# Patient Record
Sex: MALE | Race: OTHER | HISPANIC OR LATINO | Employment: OTHER | URBAN - METROPOLITAN AREA
[De-identification: names, ages, dates, MRNs, and addresses within clinical notes are randomized per-mention and may not be internally consistent; named-entity substitution may affect disease eponyms.]

---

## 2017-02-21 ENCOUNTER — HOSPITAL ENCOUNTER (OUTPATIENT)
Dept: SLEEP CENTER | Facility: HOSPITAL | Age: 42
Discharge: HOME | End: 2017-02-21
Attending: INTERNAL MEDICINE
Payer: COMMERCIAL

## 2017-02-21 DIAGNOSIS — Z99.89 OSA ON CPAP: ICD-10-CM

## 2017-02-21 DIAGNOSIS — G47.33 OSA ON CPAP: ICD-10-CM

## 2019-08-19 ENCOUNTER — TRANSCRIBE ORDERS (OUTPATIENT)
Dept: ADMINISTRATIVE | Facility: HOSPITAL | Age: 44
End: 2019-08-19

## 2019-08-19 DIAGNOSIS — M54.16 LUMBAR RADICULOPATHY: Primary | ICD-10-CM

## 2019-09-12 ENCOUNTER — HOSPITAL ENCOUNTER (OUTPATIENT)
Dept: MRI IMAGING | Facility: HOSPITAL | Age: 44
Discharge: HOME | End: 2019-09-12
Payer: COMMERCIAL

## 2019-09-12 DIAGNOSIS — M54.16 LUMBAR RADICULOPATHY: ICD-10-CM

## 2019-09-12 PROCEDURE — 72148 MRI LUMBAR SPINE W/O DYE: CPT

## 2021-07-06 ENCOUNTER — TELEPHONE (OUTPATIENT)
Dept: FAMILY MEDICINE CLINIC | Facility: CLINIC | Age: 46
End: 2021-07-06
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
## 2021-07-14 RX ORDER — IBUPROFEN 600 MG/1
600 TABLET ORAL 4 TIMES DAILY
COMMUNITY
Start: 2021-06-20 | End: 2021-08-16 | Stop reason: ALTCHOICE

## 2021-07-14 RX ORDER — TRAMADOL HYDROCHLORIDE 50 MG/1
50 TABLET ORAL EVERY 6 HOURS PRN
COMMUNITY
Start: 2021-06-20 | End: 2021-08-16 | Stop reason: ALTCHOICE

## 2021-07-15 ENCOUNTER — OFFICE VISIT (OUTPATIENT)
Dept: FAMILY MEDICINE CLINIC | Facility: CLINIC | Age: 46
End: 2021-07-15
Payer: COMMERCIAL

## 2021-07-15 VITALS
HEART RATE: 73 BPM | HEIGHT: 69 IN | BODY MASS INDEX: 29.8 KG/M2 | TEMPERATURE: 97.2 F | WEIGHT: 201.2 LBS | DIASTOLIC BLOOD PRESSURE: 82 MMHG | RESPIRATION RATE: 20 BRPM | OXYGEN SATURATION: 97 % | SYSTOLIC BLOOD PRESSURE: 130 MMHG
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
## 2021-07-15 DIAGNOSIS — M54.2 NECK PAIN: Primary | ICD-10-CM

## 2021-07-15 DIAGNOSIS — M54.50 ACUTE MIDLINE LOW BACK PAIN WITHOUT SCIATICA: ICD-10-CM

## 2021-07-15 DIAGNOSIS — R01.1 SYSTOLIC MURMUR: ICD-10-CM

## 2021-07-15 DIAGNOSIS — R42 DIZZINESS: ICD-10-CM

## 2021-07-15 DIAGNOSIS — R93.0 ABNORMAL HEAD CT: ICD-10-CM

## 2021-07-15 PROCEDURE — 99204 OFFICE O/P NEW MOD 45 MIN: CPT | Performed by: PHYSICIAN ASSISTANT

## 2021-07-15 RX ORDER — MECLIZINE HYDROCHLORIDE 25 MG/1
25 TABLET ORAL EVERY 8 HOURS PRN
Qty: 30 TABLET | Refills: 0 | Status: SHIPPED | OUTPATIENT
Start: 2021-07-15 | End: 2021-11-04 | Stop reason: ALTCHOICE

## 2021-07-15 NOTE — ASSESSMENT & PLAN NOTE
Jersey Stark reports that [facility] ED did a scan of his head and there was something abnormal, however he isn't sure what it was  Records unable to be seen via [place]; will attempt to get records from [facility], if needed will repeat head CT

## 2021-07-15 NOTE — ASSESSMENT & PLAN NOTE
Systolic murmur noted on exam  Pt denies ever being told he had a murmur in the past  No chest pain, SOB, GILMAN, palpitations  Echo ordered  If abnormal, follow up with cardiology
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
## 2021-07-15 NOTE — ASSESSMENT & PLAN NOTE
PT referral given  Continue current treatment with NSAIDs  Also recommended applying heat to the area

## 2021-07-27 ENCOUNTER — EVALUATION (OUTPATIENT)
Dept: PHYSICAL THERAPY | Facility: CLINIC | Age: 46
End: 2021-07-27
Payer: COMMERCIAL

## 2021-07-27 DIAGNOSIS — M54.50 ACUTE MIDLINE LOW BACK PAIN WITHOUT SCIATICA: ICD-10-CM

## 2021-07-27 DIAGNOSIS — M54.2 NECK PAIN: ICD-10-CM

## 2021-07-27 DIAGNOSIS — R42 DIZZINESS: ICD-10-CM

## 2021-07-27 PROCEDURE — 97110 THERAPEUTIC EXERCISES: CPT | Performed by: PHYSICAL THERAPIST

## 2021-07-27 PROCEDURE — 97162 PT EVAL MOD COMPLEX 30 MIN: CPT | Performed by: PHYSICAL THERAPIST
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
## 2021-08-05 ENCOUNTER — HOSPITAL ENCOUNTER (OUTPATIENT)
Dept: NON INVASIVE DIAGNOSTICS | Age: 46
Discharge: HOME | End: 2021-08-05
Payer: COMMERCIAL

## 2021-08-05 ENCOUNTER — APPOINTMENT (OUTPATIENT)
Dept: PHYSICAL THERAPY | Facility: CLINIC | Age: 46
End: 2021-08-05
Payer: COMMERCIAL

## 2021-08-05 DIAGNOSIS — R01.1 SYSTOLIC MURMUR: ICD-10-CM

## 2021-08-05 PROCEDURE — 93306 TTE W/DOPPLER COMPLETE: CPT

## 2021-08-05 PROCEDURE — 93306 TTE W/DOPPLER COMPLETE: CPT | Performed by: INTERNAL MEDICINE

## 2021-08-06 ENCOUNTER — TELEPHONE (OUTPATIENT)
Dept: FAMILY MEDICINE CLINIC | Facility: CLINIC | Age: 46
End: 2021-08-06

## 2021-08-06 DIAGNOSIS — Q23.1 BICUSPID AORTIC VALVE: Primary | ICD-10-CM

## 2021-08-06 NOTE — TELEPHONE ENCOUNTER
Try to call pt mailbox full next appt 8/16/21 will try to contact again       ----- Message from Shana Park PA-C sent at 8/6/2021  8:29 AM EDT -----  Please call pt regarding echo results  His aortic valve was found to have two leaflets  Normally, the aortic valve has three leaflets  This abnormality is usually hereditary, and may or may not cause problems such as narrowing or stiffening of the aortic valve over time  Please make an appointment with cardiology to discuss this further and for recommendations for management (referral placed)

## 2021-08-10 ENCOUNTER — OFFICE VISIT (OUTPATIENT)
Dept: PHYSICAL THERAPY | Facility: CLINIC | Age: 46
End: 2021-08-10
Payer: COMMERCIAL

## 2021-08-10 DIAGNOSIS — R42 DIZZINESS: ICD-10-CM

## 2021-08-10 DIAGNOSIS — M54.2 NECK PAIN: Primary | ICD-10-CM

## 2021-08-10 DIAGNOSIS — M54.50 ACUTE MIDLINE LOW BACK PAIN WITHOUT SCIATICA: ICD-10-CM

## 2021-08-10 PROCEDURE — 97110 THERAPEUTIC EXERCISES: CPT | Performed by: PHYSICAL THERAPIST

## 2021-08-10 PROCEDURE — 97140 MANUAL THERAPY 1/> REGIONS: CPT | Performed by: PHYSICAL THERAPIST

## 2021-08-10 PROCEDURE — 97112 NEUROMUSCULAR REEDUCATION: CPT | Performed by: PHYSICAL THERAPIST

## 2021-08-10 NOTE — PROGRESS NOTES
Daily Note     Today's date: 8/10/2021  Patient name: Jennifer Laura  : 1975  MRN: [id number]  Referring provider: Verlan Alpers, PA-C  Dx:   Encounter Diagnosis     ICD-10-CM    1  Neck pain  M54 2    2  Acute midline low back pain without sciatica  M54 5    3  Dizziness  R42                   Subjective: Patient reports since his IE he is feeling a little bit better overall with mobility  He has been working in a very busy environment  He reports (L) sidebending is better but he still has a lot of restrictions and pain with (R) sidebending  He reports his HEP is going well  Objective: See treatment diary below      Assessment:   Stage: subacute  Stability of Symptoms:  At Evaluation: stable - unchanged   Global Rating of Change:   Symptom Irritability Level: high  Primary Movement Diagnosis: cervical and thoracic hypomobility   Patient Specific Functional Scale:   21: return to working without restrictions 2/10, full neck ROM 3/10, return to heavy lifting 1/10; Total   FOTO Prediction: 59 by visit 15  FOTO Progress: 39 at evaluation   Current Activity Recommendations: added to HEP (); added to HEP (8/10)  Current Educational Needs: progressions    Patient had good tolerance to manual treatment - significant improvement was noted in cervical and thoracic ROM following  He is making very good progress with PT - continues to require some guidance managing work participation  HEP was updated with good understanding  Skilled PT continues to be required to guide progression of thoracic and cervical mobility and strength to allow him to return to working full time without restrictions       Plan: Continue with POC - monitor tolerance to treatment and updated HEP - progress to modified prone strengthening NV     Daily Treatment Diary     DX: Neck Pain, LBP  EPOC: 21  Precautions: NA  CO-MORBIDITIES: NA  HEP ACCESS CODE: [facility]      Treatment Diary  8/10        Manual Therapy Thoracic Mobs PA  Gr 2/3  2 min        Cervical Mobs PA  Gr 2/3  2 min        Cervical Traction  4 min                                            Therapeutic Exercise  HEP         UBE  2'/2'                  Cervical AROM Matrix 7/27 6 Way  10x ea        Thoracic AROM Matrix  7/27 6Way  10x ea                  Cervical Isometrics  8/10 4 Way  5"x10                  Mod Prone Sh' Row          Mod Prone Sh' Ext                              Neuromuscular Reeducation                    TB Sh' Row  GTB  2x10        TB Sh' Ext  GTB  2x10        TB Triceps  GTB  2x10        TB (B) ER          TB Uni H' ABD                                                             Therapeutic Activity                              Gait Training                                        Modalities

## 2021-08-12 ENCOUNTER — OFFICE VISIT (OUTPATIENT)
Dept: PHYSICAL THERAPY | Facility: CLINIC | Age: 46
End: 2021-08-12
Payer: COMMERCIAL

## 2021-08-12 DIAGNOSIS — M54.2 NECK PAIN: Primary | ICD-10-CM

## 2021-08-12 DIAGNOSIS — R42 DIZZINESS: ICD-10-CM

## 2021-08-12 DIAGNOSIS — M54.50 ACUTE MIDLINE LOW BACK PAIN WITHOUT SCIATICA: ICD-10-CM

## 2021-08-12 PROCEDURE — 97112 NEUROMUSCULAR REEDUCATION: CPT | Performed by: PHYSICAL THERAPIST

## 2021-08-12 PROCEDURE — 97110 THERAPEUTIC EXERCISES: CPT | Performed by: PHYSICAL THERAPIST

## 2021-08-12 PROCEDURE — 97140 MANUAL THERAPY 1/> REGIONS: CPT | Performed by: PHYSICAL THERAPIST

## 2021-08-12 NOTE — PROGRESS NOTES
Daily Note     Today's date: 2021  Patient name: Alexandrea Kraus  : 1975  MRN: [id number]  Referring provider: Rayne Jones PA-C  Dx:   Encounter Diagnosis     ICD-10-CM    1  Neck pain  M54 2    2  Acute midline low back pain without sciatica  M54 5    3  Dizziness  R42                   Subjective: Patient reports good tolerance to his LV  He reports he continues to have some (R) shoulder soreness and restrictions with working  He has been performing his HEP regularly  Objective: See treatment diary below      Assessment:   Stage: subacute  Stability of Symptoms:  At Evaluation: stable - unchanged   Global Rating of Change:   Symptom Irritability Level: high  Primary Movement Diagnosis: cervical and thoracic hypomobility   Patient Specific Functional Scale:   21: return to working without restrictions 2/10, full neck ROM 3/10, return to heavy lifting 1/10; Total   FOTO Prediction: 59 by visit 15  FOTO Progress: 39 at evaluation   Current Activity Recommendations: added to HEP (); added to HEP (8/10)  Current Educational Needs: progressions    Patient continues to respond well to manual treatment - thoracic and cervical ROM continue to improve well  He had slight discomfort with modified prone exercises  He continues to make good progress with PT - limited with work demands  Skilled PT continues to be required to guide progression of thoracic and cervical mobility and strength to allow him to return to working full time without restrictions  Plan: Continue with POC - monitor tolerance to treatment   Progress shoulder strengthening NV       Daily Treatment Diary     DX: Neck Pain, LBP  EPOC: 21  Precautions: NA  CO-MORBIDITIES: NA  HEP ACCESS CODE: [facility]      Treatment Diary  8/10 8/12       Manual Therapy         Thoracic Mobs PA  Gr 2/3  2 min PA  Gr 2/3  2 min       Cervical Mobs PA  Gr 2/3  2 min PA   Gr 2/3  2 min       Cervical Traction  4 min 4 min Therapeutic Exercise  HEP         UBE  2'/2' 2'/2'                 Cervical AROM Matrix 7/27 6 Way  10x ea 6 Way   15x ea       Thoracic AROM Matrix  7/27 6 Way  10x ea 6 Way  15x ea                 Cervical Isometrics  8/10 4 Way  5"x10 4 Way  5"x10                 Mod Prone Sh' Row   5# 15x ea       Mod Prone Sh' Ext   2# 15x ea                 Standing Sh' Flex          Standing Sh' Scaption          Standing Sh' ABD                    Neuromuscular Reeducation                    TB Sh' Row  GTB  2x10 GTB  20x       TB Sh' Ext  GTB  2x10 GTB  20x       TB Triceps  GTB  2x10 BTB  20x       TB (B) ER          TB Uni H' ABD                                                             Therapeutic Activity                              Gait Training                                        Modalities

## 2021-08-16 ENCOUNTER — OFFICE VISIT (OUTPATIENT)
Dept: FAMILY MEDICINE CLINIC | Facility: CLINIC | Age: 46
End: 2021-08-16
Payer: COMMERCIAL

## 2021-08-16 VITALS
RESPIRATION RATE: 20 BRPM | SYSTOLIC BLOOD PRESSURE: 118 MMHG | BODY MASS INDEX: 29.89 KG/M2 | OXYGEN SATURATION: 99 % | HEART RATE: 67 BPM | TEMPERATURE: 97.2 F | DIASTOLIC BLOOD PRESSURE: 80 MMHG | WEIGHT: 201.8 LBS | HEIGHT: 69 IN

## 2021-08-16 DIAGNOSIS — Q23.1 BICUSPID AORTIC VALVE: Primary | ICD-10-CM
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
## 2021-08-16 DIAGNOSIS — R91.1 LUNG NODULE < 6CM ON CT: ICD-10-CM

## 2021-08-16 PROBLEM — M54.2 NECK PAIN: Status: RESOLVED | Noted: 2021-07-15 | Resolved: 2021-08-16

## 2021-08-16 PROBLEM — R93.0 ABNORMAL HEAD CT: Status: RESOLVED | Noted: 2021-07-15 | Resolved: 2021-08-16

## 2021-08-16 PROBLEM — R42 DIZZINESS: Status: RESOLVED | Noted: 2021-07-15 | Resolved: 2021-08-16

## 2021-08-16 PROBLEM — R01.1 SYSTOLIC MURMUR: Status: RESOLVED | Noted: 2021-07-15 | Resolved: 2021-08-16

## 2021-08-16 PROCEDURE — 99214 OFFICE O/P EST MOD 30 MIN: CPT | Performed by: PHYSICIAN ASSISTANT
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
## 2021-08-16 NOTE — ASSESSMENT & PLAN NOTE
Neck pain, back pain, dizziness much improved  Imaging from [place] reviewed; all normal except for small incidental lung nodule on CT chest, as above  No longer requiring pain meds   Continue PT

## 2021-08-16 NOTE — ASSESSMENT & PLAN NOTE
Bicuspid aortic valve with moderate stenosis and mild-moderate regurg on echo 8/5/21  Discussed results with pt and son  He is asymptomatic, however has family history of heart problems  Systolic murmur on exam     Recommend follow up with cardiologist for monitoring and management as needed

## 2021-08-24 ENCOUNTER — OFFICE VISIT (OUTPATIENT)
Dept: PHYSICAL THERAPY | Facility: CLINIC | Age: 46
End: 2021-08-24
Payer: COMMERCIAL

## 2021-08-24 DIAGNOSIS — M54.50 ACUTE MIDLINE LOW BACK PAIN WITHOUT SCIATICA: ICD-10-CM

## 2021-08-24 DIAGNOSIS — M54.2 NECK PAIN: Primary | ICD-10-CM

## 2021-08-24 PROCEDURE — 97110 THERAPEUTIC EXERCISES: CPT | Performed by: PHYSICAL THERAPIST

## 2021-08-24 PROCEDURE — 97112 NEUROMUSCULAR REEDUCATION: CPT | Performed by: PHYSICAL THERAPIST

## 2021-08-24 PROCEDURE — 97140 MANUAL THERAPY 1/> REGIONS: CPT | Performed by: PHYSICAL THERAPIST

## 2021-08-24 NOTE — PROGRESS NOTES
Daily Note     Today's date: 2021  Patient name: Jennifer Laura  : 1975  MRN: [id number]  Referring provider: Verlan Alpers, PA-C  Dx:   Encounter Diagnosis     ICD-10-CM    1  Neck pain  M54 2    2  Acute midline low back pain without sciatica  M54 5               Subjective: Patient reports no adverse reaction to his LV  He reports on 21 he had his wisdom teeth taken out - has significant increase in jaw pain and headaches since  He reports seeing improvements with shoulder pain with work activities - still limited with lifting  He reports his HEP is going well  Objective: See treatment diary below      Assessment:   Stage: subacute  Stability of Symptoms:  At Evaluation: stable - unchanged   Global Rating of Change:   Symptom Irritability Level: high  Primary Movement Diagnosis: cervical and thoracic hypomobility   Patient Specific Functional Scale:   21: return to working without restrictions 2/10, full neck ROM 3/10, return to heavy lifting 1/10; Total   FOTO Prediction: 59 by visit 15  FOTO Progress: 39 at evaluation   Current Activity Recommendations: added to HEP (); added to HEP (8/10)  Current Educational Needs: progressions      Patient continues to respond well to manual treatment - cervical and thoracic mobility continue to improve  He was able to complete exercises without complaints of pain  Exercises not progressed secondary to recent oral surgery  Skilled PT continues to be required to guide progression of thoracic and cervical mobility and strength to allow him to return to working full time without restrictions  Plan: Continue with POC - monitor tolerance to treatment   Progress shoulder strengthening NV       Daily Treatment Diary     DX: Neck Pain, LBP  EPOC: 21  Precautions: NA  CO-MORBIDITIES: NA  HEP ACCESS CODE: [facility]      Treatment Diary  8/10 8/12 8/24      Manual Therapy         Thoracic Mobs PA  Gr 2/3  2 min PA  Gr 2/3  2 min PA  Gr 2/3  2 min      Cervical Mobs PA  Gr 2/3  2 min PA   Gr 2/3  2 min PA  Gr 2/3  2 min      Cervical Traction  4 min 4 min 4 min                                          Therapeutic Exercise  HEP         UBE  2'/2' 2'/2' 2'/2'                Cervical AROM Matrix 7/27 6 Way  10x ea 6 Way   15x ea 6 Way  15x ea      Thoracic AROM Matrix  7/27 6 Way  10x ea 6 Way  15x ea 6 Way  15x ea                Cervical Isometrics  8/10 4 Way  5"x10 ea 4 Way  5"x10 ea 4 Way  5"x10 ea                Mod Prone Sh' Row   5# 15x ea 5# 20x ea      Mod Prone Sh' Ext   2# 15x ea 2# 20x ea                Standing Sh' Flex          Standing Sh' Scaption          Standing Sh' ABD                    Neuromuscular Reeducation                    TB Sh' Row  GTB  2x10 GTB  20x GTB  20x      TB Sh' Ext  GTB  2x10 GTB  20x GTB  20x      TB Triceps  GTB  2x10 BTB  20x BTB  20x      TB (B) ER          TB Uni H' ABD                                                             Therapeutic Activity                              Gait Training                                        Modalities

## 2021-08-26 ENCOUNTER — OFFICE VISIT (OUTPATIENT)
Dept: PHYSICAL THERAPY | Facility: CLINIC | Age: 46
End: 2021-08-26
Payer: COMMERCIAL

## 2021-08-26 DIAGNOSIS — M54.50 ACUTE MIDLINE LOW BACK PAIN WITHOUT SCIATICA: ICD-10-CM

## 2021-08-26 DIAGNOSIS — M54.2 NECK PAIN: Primary | ICD-10-CM

## 2021-08-26 PROCEDURE — 97110 THERAPEUTIC EXERCISES: CPT

## 2021-08-26 PROCEDURE — 97140 MANUAL THERAPY 1/> REGIONS: CPT

## 2021-08-26 PROCEDURE — 97112 NEUROMUSCULAR REEDUCATION: CPT

## 2021-08-26 NOTE — PROGRESS NOTES
Daily Note     Today's date: 2021  Patient name: Abeba Morales  : 1975  MRN: [id number]  Referring provider: Navi West PA-C  Dx:   Encounter Diagnosis     ICD-10-CM    1  Neck pain  M54 2    2  Acute midline low back pain without sciatica  M54 5               Subjective: Patient reports he was a little sore after LV  He reports overall he is doing a little better but his low back is bothering him today  He reports as he becomes more consistent with HEP it has been helping him      Objective: See treatment diary below      Assessment:   Stage: subacute  Stability of Symptoms:  At Evaluation: stable - unchanged   Global Rating of Change:   Symptom Irritability Level: high  Primary Movement Diagnosis: cervical and thoracic hypomobility   Patient Specific Functional Scale:   21: return to working without restrictions 2/10, full neck ROM 3/10, return to heavy lifting 1/10; Total   FOTO Prediction: 59 by visit 15  FOTO Progress: 39 at evaluation   Current Activity Recommendations: added to HEP (); added to HEP (8/10)  Current Educational Needs: progressions      Patient responded well to manual therapy today with improved mobility and less discomfort with cervical movements  He was able to tolerated reintroducing shoulder strengthening TE's today with no adverse effects  He began to fatigue by end of session but felt better from when he had started session  He would continue to benefit from skilled PT  Plan: Continue with POC - monitor tolerance to treatment  Monitor tolerance to progressed session          Daily Treatment Diary     DX: Neck Pain, LBP  EPOC: 21  Precautions: NA  CO-MORBIDITIES: NA  HEP ACCESS CODE: [facility]      Treatment Diary  8/10 8/12 8/24 8/26     Manual Therapy         Thoracic Mobs PA  Gr 2/3  2 min PA  Gr 2/3  2 min PA  Gr 2/3  2 min PA  Gr 2/3  ME PT     Cervical Mobs PA  Gr 2/3  2 min PA   Gr 2/3  2 min PA  Gr 2/3  2 min PA  Gr 2/3  ME PT     Cervical Traction  4 min 4 min 4 min 4 min                                         Therapeutic Exercise  HEP         UBE  2'/2' 2'/2' 2'/2' 2'/2'               Cervical AROM Matrix 7/27 6 Way  10x ea 6 Way   15x ea 6 Way  15x ea 6 Way  15x ea     Thoracic AROM Matrix  7/27 6 Way  10x ea 6 Way  15x ea 6 Way  15x ea 6 Way  15x ea               Cervical Isometrics  8/10 4 Way  5"x10 ea 4 Way  5"x10 ea 4 Way  5"x10 ea 4 Way  5"x10 ea               Mod Prone Sh' Row   5# 15x ea 5# 20x ea 5# 20x ea     Mod Prone Sh' Ext   2# 15x ea 2# 20x ea 5# 20x ea               Standing Sh' Flex     x10     Standing Sh' Scaption     x10     Standing Sh' ABD     x10               Neuromuscular Reeducation                    TB Sh' Row  GTB  2x10 GTB  20x GTB  20x GTB  20x     TB Sh' Ext  GTB  2x10 GTB  20x GTB  20x GTB  20x     TB Triceps  GTB  2x10 BTB  20x BTB  20x      TB (B) ER     GTB  20x     TB Uni H' ABD                                                             Therapeutic Activity                              Gait Training                                        Modalities

## 2021-08-31 ENCOUNTER — APPOINTMENT (OUTPATIENT)
Dept: PHYSICAL THERAPY | Facility: CLINIC | Age: 46
End: 2021-08-31
Payer: COMMERCIAL

## 2021-09-02 ENCOUNTER — OFFICE VISIT (OUTPATIENT)
Dept: PHYSICAL THERAPY | Facility: CLINIC | Age: 46
End: 2021-09-02
Payer: COMMERCIAL

## 2021-09-02 DIAGNOSIS — M54.2 NECK PAIN: Primary | ICD-10-CM

## 2021-09-02 DIAGNOSIS — M54.50 ACUTE MIDLINE LOW BACK PAIN WITHOUT SCIATICA: ICD-10-CM

## 2021-09-02 PROCEDURE — 97140 MANUAL THERAPY 1/> REGIONS: CPT

## 2021-09-02 PROCEDURE — 97110 THERAPEUTIC EXERCISES: CPT

## 2021-09-02 PROCEDURE — 97112 NEUROMUSCULAR REEDUCATION: CPT

## 2021-09-02 NOTE — PROGRESS NOTES
Daily Note     Today's date: 2021  Patient name: Renetta Elias  : 1975  MRN: [id number]  Referring provider: Yovanny Kelley PA-C  Dx:   Encounter Diagnosis     ICD-10-CM    1  Neck pain  M54 2    2  Acute midline low back pain without sciatica  M54 5               Subjective: Patient reports good tolerance to his LV  He reports he is doing a little better overall  He continues to perform HEP and this helps him improve his mobility a lot, especially before bed it helps him sleep better  Objective: See treatment diary below      Assessment:   Stage: subacute  Stability of Symptoms:  At Evaluation: stable - unchanged   Global Rating of Change:   Symptom Irritability Level: high  Primary Movement Diagnosis: cervical and thoracic hypomobility   Patient Specific Functional Scale:   21: return to working without restrictions 2/10, full neck ROM 3/10, return to heavy lifting 1/10; Total   FOTO Prediction: 59 by visit 15  FOTO Progress: 39 at evaluation   Current Activity Recommendations: added to HEP (); added to HEP (8/10); Added to Hep ()  Current Educational Needs: progressions      Patient continues to respond well to manual therapy with improved cervical mobility and less discomfort  He continues to be able to tolerate slight progressions to program with no adverse effects  He began to fatigue by end of session but felt better from when he had started session  He would continue to benefit from skilled PT  Plan: Continue with POC - monitor tolerance to treatment  Monitor tolerance to progressed session          Daily Treatment Diary     DX: Neck Pain, LBP  EPOC: 21  Precautions: NA  CO-MORBIDITIES: NA  HEP ACCESS CODE: [facility]      Treatment Diary  8/10 8/12 8/24 8/26 9    Manual Therapy         Thoracic Mobs PA  Gr 2/3  2 min PA  Gr 2/3  2 min PA  Gr 2/3  2 min PA  Gr 2/3  ME PT     Cervical Mobs PA  Gr 2/3  2 min PA   Gr 2/3  2 min PA  Gr 2/3  2 min PA  Gr 2/3  ME PT Cervical Traction  4 min 4 min 4 min 4 min 4 min    (L) UT STM/Trp     4 min                               Therapeutic Exercise  HEP         UBE  2'/2' 2'/2' 2'/2' 2'/2' 2'/2'              Cervical AROM Matrix 7/27 6 Way  10x ea 6 Way   15x ea 6 Way  15x ea 6 Way  15x ea 6 Way  15x ea    Thoracic AROM Matrix  7/27 6 Way  10x ea 6 Way  15x ea 6 Way  15x ea 6 Way  15x ea 6 Way  15x ea              Cervical Isometrics  8/10 4 Way  5"x10 ea 4 Way  5"x10 ea 4 Way  5"x10 ea 4 Way  5"x10 ea 4 Way  5"x10 ea              Mod Prone Sh' Row   5# 15x ea 5# 20x ea 5# 20x ea     Mod Prone Sh' Ext   2# 15x ea 2# 20x ea 5# 20x ea               Standing Sh' Flex     x10 2# x10    Standing Sh' Scaption     x10 2# x10    Standing Sh' ABD     x10 0# x10              Neuromuscular Reeducation                    TB Sh' Row 9/2 GTB  2x10 GTB  20x GTB  20x GTB  20x     TB Sh' Ext 9/2 GTB  2x10 GTB  20x GTB  20x GTB  20x     TB Triceps  GTB  2x10 BTB  20x BTB  20x      TB (B) ER 9/2    GTB  20x     TB Uni H' ABD      nv                                                       Therapeutic Activity                              Gait Training                                        Modalities

## 2021-09-07 ENCOUNTER — APPOINTMENT (OUTPATIENT)
Dept: PHYSICAL THERAPY | Facility: CLINIC | Age: 46
End: 2021-09-07
Payer: COMMERCIAL

## 2021-11-05 ENCOUNTER — TELEPHONE (OUTPATIENT)
Dept: FAMILY MEDICINE CLINIC | Facility: CLINIC | Age: 46
End: 2021-11-05

## 2021-11-05 ENCOUNTER — TELEMEDICINE (OUTPATIENT)
Dept: FAMILY MEDICINE CLINIC | Facility: CLINIC | Age: 46
End: 2021-11-05

## 2021-11-05 VITALS — BODY MASS INDEX: 29.68 KG/M2 | WEIGHT: 201 LBS

## 2021-11-05 DIAGNOSIS — U07.1 COVID-19: Primary | ICD-10-CM

## 2021-11-05 PROCEDURE — 99213 OFFICE O/P EST LOW 20 MIN: CPT | Performed by: FAMILY MEDICINE

## 2021-11-08 ENCOUNTER — TELEMEDICINE (OUTPATIENT)
Dept: FAMILY MEDICINE CLINIC | Facility: CLINIC | Age: 46
End: 2021-11-08

## 2021-11-08 DIAGNOSIS — U07.1 COVID-19: Primary | ICD-10-CM

## 2021-11-08 DIAGNOSIS — J02.9 SORE THROAT: ICD-10-CM

## 2021-11-08 PROCEDURE — 99214 OFFICE O/P EST MOD 30 MIN: CPT | Performed by: PHYSICIAN ASSISTANT

## 2021-11-18 ENCOUNTER — OFFICE VISIT (OUTPATIENT)
Dept: FAMILY MEDICINE CLINIC | Facility: CLINIC | Age: 46
End: 2021-11-18

## 2021-11-18 VITALS
DIASTOLIC BLOOD PRESSURE: 80 MMHG | HEIGHT: 69 IN | RESPIRATION RATE: 20 BRPM | SYSTOLIC BLOOD PRESSURE: 121 MMHG | WEIGHT: 203.6 LBS | BODY MASS INDEX: 30.16 KG/M2 | HEART RATE: 65 BPM | OXYGEN SATURATION: 97 % | TEMPERATURE: 97.7 F

## 2021-11-18 DIAGNOSIS — R53.83 OTHER FATIGUE: ICD-10-CM

## 2021-11-18 DIAGNOSIS — J98.8 CONGESTION OF UPPER AIRWAY: ICD-10-CM

## 2021-11-18 DIAGNOSIS — Z11.59 ENCOUNTER FOR HEPATITIS C SCREENING TEST FOR LOW RISK PATIENT: ICD-10-CM

## 2021-11-18 DIAGNOSIS — U07.1 COVID-19: Primary | ICD-10-CM

## 2021-11-18 DIAGNOSIS — Z13.220 SCREENING CHOLESTEROL LEVEL: ICD-10-CM

## 2021-11-18 DIAGNOSIS — Z11.4 SCREENING FOR HIV (HUMAN IMMUNODEFICIENCY VIRUS): ICD-10-CM

## 2021-11-18 PROBLEM — R91.1 LUNG NODULE < 6CM ON CT: Status: RESOLVED | Noted: 2021-08-16 | Resolved: 2021-11-18

## 2021-11-18 PROCEDURE — 99214 OFFICE O/P EST MOD 30 MIN: CPT | Performed by: PHYSICIAN ASSISTANT

## 2021-11-18 RX ORDER — GUAIFENESIN 600 MG
600 TABLET, EXTENDED RELEASE 12 HR ORAL EVERY 12 HOURS SCHEDULED
Qty: 14 TABLET | Refills: 0 | Status: SHIPPED | OUTPATIENT
Start: 2021-11-18

## 2021-11-18 RX ORDER — GUAIFENESIN 600 MG
600 TABLET, EXTENDED RELEASE 12 HR ORAL EVERY 12 HOURS SCHEDULED
Qty: 10 TABLET | Refills: 0 | Status: SHIPPED | OUTPATIENT
Start: 2021-11-18 | End: 2021-11-18

## 2021-12-20 PROBLEM — U07.1 COVID-19: Status: RESOLVED | Noted: 2021-11-08 | Resolved: 2021-12-20
